# Patient Record
Sex: MALE | Race: WHITE | NOT HISPANIC OR LATINO | Employment: FULL TIME | URBAN - NONMETROPOLITAN AREA
[De-identification: names, ages, dates, MRNs, and addresses within clinical notes are randomized per-mention and may not be internally consistent; named-entity substitution may affect disease eponyms.]

---

## 2017-05-15 ENCOUNTER — OFFICE VISIT (OUTPATIENT)
Dept: GASTROENTEROLOGY | Facility: CLINIC | Age: 57
End: 2017-05-15

## 2017-05-15 VITALS
HEART RATE: 78 BPM | BODY MASS INDEX: 36.88 KG/M2 | TEMPERATURE: 97.8 F | DIASTOLIC BLOOD PRESSURE: 76 MMHG | WEIGHT: 235 LBS | SYSTOLIC BLOOD PRESSURE: 120 MMHG | HEIGHT: 67 IN

## 2017-05-15 DIAGNOSIS — K21.9 GASTROESOPHAGEAL REFLUX DISEASE WITHOUT ESOPHAGITIS: Primary | ICD-10-CM

## 2017-05-15 PROCEDURE — 99213 OFFICE O/P EST LOW 20 MIN: CPT | Performed by: INTERNAL MEDICINE

## 2017-05-15 RX ORDER — PREDNISOLONE ACETATE 10 MG/ML
SUSPENSION/ DROPS OPHTHALMIC
COMMUNITY
Start: 2017-02-27 | End: 2018-05-24

## 2017-05-15 RX ORDER — PANTOPRAZOLE SODIUM 40 MG/1
40 TABLET, DELAYED RELEASE ORAL
Qty: 180 TABLET | Refills: 3 | Status: SHIPPED | OUTPATIENT
Start: 2017-05-15 | End: 2018-05-24 | Stop reason: SDUPTHER

## 2018-05-24 ENCOUNTER — OFFICE VISIT (OUTPATIENT)
Dept: GASTROENTEROLOGY | Facility: CLINIC | Age: 58
End: 2018-05-24

## 2018-05-24 VITALS
HEIGHT: 67 IN | OXYGEN SATURATION: 98 % | SYSTOLIC BLOOD PRESSURE: 138 MMHG | HEART RATE: 80 BPM | DIASTOLIC BLOOD PRESSURE: 84 MMHG | BODY MASS INDEX: 37.83 KG/M2 | TEMPERATURE: 97.5 F | WEIGHT: 241 LBS

## 2018-05-24 DIAGNOSIS — Z86.010 HISTORY OF ADENOMATOUS POLYP OF COLON: ICD-10-CM

## 2018-05-24 DIAGNOSIS — K21.9 GASTROESOPHAGEAL REFLUX DISEASE, ESOPHAGITIS PRESENCE NOT SPECIFIED: Primary | ICD-10-CM

## 2018-05-24 PROBLEM — Z86.0101 HISTORY OF ADENOMATOUS POLYP OF COLON: Status: ACTIVE | Noted: 2018-05-24

## 2018-05-24 PROCEDURE — 99213 OFFICE O/P EST LOW 20 MIN: CPT | Performed by: NURSE PRACTITIONER

## 2018-05-24 RX ORDER — PANTOPRAZOLE SODIUM 40 MG/1
40 TABLET, DELAYED RELEASE ORAL
Qty: 180 TABLET | Refills: 3 | Status: SHIPPED | OUTPATIENT
Start: 2018-05-24 | End: 2019-12-09 | Stop reason: SDUPTHER

## 2018-05-24 NOTE — PROGRESS NOTES
Chief Complaint   Patient presents with   • Follow-up     Patient here for yearly follow up with medication refill of protonix.        Subjective   HPI    Pt presents to office with long  history of GERD related symptoms.  GERD currently described as stable  Pt is maintained on Protonix daily will increase to bid if needed (not often) .  Pt denies heartburn, indigestion, N/V, abdominal pain.  Pt is compliant with medication instruction.  Last EGD: 2015.  This procedure reviewed with patient.    CScope (Dr Tai) 2015-medium, sessile tubular adenomatous polyps removed from prox ascending colon  Endoscopy (Dr Tai) 2015 antriits    No chest pain, no SOA, no home o2    Past Medical History:   Diagnosis Date   • Acid reflux    • Bronchitis    • History of ear infections      Outpatient Prescriptions Marked as Taking for the 5/24/18 encounter (Office Visit) with RAMON Aleman   Medication Sig Dispense Refill   • pantoprazole (PROTONIX) 40 MG EC tablet Take 1 tablet by mouth 2 (Two) Times a Day Before Meals. 180 tablet 3   • [DISCONTINUED] pantoprazole (PROTONIX) 40 MG EC tablet Take 1 tablet by mouth 2 (Two) Times a Day Before Meals. 180 tablet 3     No Known Allergies  Social History     Social History   • Marital status:      Spouse name: N/A   • Number of children: N/A   • Years of education: N/A     Occupational History   • Not on file.     Social History Main Topics   • Smoking status: Never Smoker   • Smokeless tobacco: Never Used   • Alcohol use No   • Drug use: No   • Sexual activity: Not on file     Other Topics Concern   • Not on file     Social History Narrative   • No narrative on file     Family History   Problem Relation Age of Onset   • Diabetes Mother    • Heart disease Mother    • Heart disease Father    • Diabetes Father    • Cancer Sister    • Cancer Brother    • Cancer Paternal Grandmother    • Cancer Paternal Grandfather    • Colon cancer Neg Hx    • Esophageal cancer Neg Hx       Review of Systems   Constitutional: Negative for fatigue, fever and unexpected weight change.   HENT: Negative for hearing loss, sore throat and voice change.    Eyes: Negative for visual disturbance.   Respiratory: Negative for cough, shortness of breath and wheezing.    Cardiovascular: Negative for chest pain and palpitations.   Gastrointestinal: Negative for abdominal pain, blood in stool and vomiting.   Endocrine: Negative for polydipsia and polyuria.   Genitourinary: Negative for difficulty urinating, dysuria, hematuria and urgency.   Musculoskeletal: Negative for joint swelling and myalgias.   Skin: Negative for color change, rash and wound.   Neurological: Negative for dizziness, tremors, seizures and syncope.   Hematological: Does not bruise/bleed easily.   Psychiatric/Behavioral: Negative for agitation and confusion. The patient is not nervous/anxious.      Objective   Vitals:    05/24/18 0853   BP: 138/84   Pulse: 80   Temp: 97.5 °F (36.4 °C)   SpO2: 98%     Physical Exam   Constitutional: He is oriented to person, place, and time. He appears well-developed and well-nourished. He is cooperative.   HENT:   Head: Normocephalic and atraumatic.   Eyes: Conjunctivae are normal. Pupils are equal, round, and reactive to light. No scleral icterus.   Neck: Normal range of motion. Neck supple. No JVD present. No thyroid mass and no thyromegaly present.   Cardiovascular: Normal rate, regular rhythm and normal heart sounds.  Exam reveals no gallop and no friction rub.    No murmur heard.  Pulmonary/Chest: Effort normal and breath sounds normal. No accessory muscle usage. No respiratory distress. He has no wheezes. He has no rales.   Abdominal: Soft. Normal appearance and bowel sounds are normal. He exhibits no distension, no ascites and no mass. There is no hepatosplenomegaly. There is no tenderness. There is no rebound and no guarding.   Musculoskeletal: Normal range of motion. He exhibits no edema or tenderness.      Vascular Status -  His right foot exhibits normal foot vasculature  and no edema. His left foot exhibits normal foot vasculature  and no edema.  Lymphadenopathy:     He has no cervical adenopathy.   Neurological: He is alert and oriented to person, place, and time. He has normal strength. Gait normal.   Skin: Skin is warm, dry and intact. No rash noted.     Imaging Results (most recent)     None          Body mass index is 37.75 kg/m².    Assessment/Plan   Gene was seen today for follow-up.    Diagnoses and all orders for this visit:    Gastroesophageal reflux disease, esophagitis presence not specified    History of adenomatous polyp of colon  -     Case Request; Standing  -     Implement Anesthesia Orders Day of Procedure; Standing  -     Obtain Informed Consent; Standing  -     Case Request    Other orders  -     pantoprazole (PROTONIX) 40 MG EC tablet; Take 1 tablet by mouth 2 (Two) Times a Day Before Meals.      COLONOSCOPY WITH ANESTHESIA (N/A)     Pt will call office in Dec 2018/Jan 2019 when ready to schedule cscope  clenpiq prep given to pt    Continue protonix daily, take 30 min prior to meal    There are no Patient Instructions on file for this visit.

## 2018-05-25 ENCOUNTER — TELEPHONE (OUTPATIENT)
Dept: GASTROENTEROLOGY | Facility: CLINIC | Age: 58
End: 2018-05-25

## 2018-05-25 NOTE — TELEPHONE ENCOUNTER
Pt not due until Dec 2018    Please cancel csope    Pt/wife will call back when ready to schedule as they may wait until Jan 2019

## 2018-10-04 ENCOUNTER — TELEPHONE (OUTPATIENT)
Dept: GASTROENTEROLOGY | Facility: CLINIC | Age: 58
End: 2018-10-04

## 2018-10-04 ENCOUNTER — PREP FOR SURGERY (OUTPATIENT)
Dept: OTHER | Facility: HOSPITAL | Age: 58
End: 2018-10-04

## 2018-10-04 DIAGNOSIS — Z86.010 HISTORY OF ADENOMATOUS POLYP OF COLON: Primary | ICD-10-CM

## 2018-10-04 NOTE — TELEPHONE ENCOUNTER
patient has called and rescheduled his colon for 12-7-18 at 7. I think I need a new case request   thanks

## 2018-10-05 NOTE — TELEPHONE ENCOUNTER
Case request entered  If he does not have a prep have him come by office and pick one up please    ty

## 2018-12-07 ENCOUNTER — ANESTHESIA EVENT (OUTPATIENT)
Dept: GASTROENTEROLOGY | Facility: HOSPITAL | Age: 58
End: 2018-12-07

## 2018-12-07 ENCOUNTER — HOSPITAL ENCOUNTER (OUTPATIENT)
Facility: HOSPITAL | Age: 58
Setting detail: HOSPITAL OUTPATIENT SURGERY
Discharge: HOME | End: 2018-12-07
Attending: INTERNAL MEDICINE | Admitting: INTERNAL MEDICINE

## 2018-12-07 ENCOUNTER — ANESTHESIA (OUTPATIENT)
Dept: GASTROENTEROLOGY | Facility: HOSPITAL | Age: 58
End: 2018-12-07

## 2018-12-07 VITALS
DIASTOLIC BLOOD PRESSURE: 70 MMHG | RESPIRATION RATE: 26 BRPM | HEART RATE: 74 BPM | BODY MASS INDEX: 36.41 KG/M2 | SYSTOLIC BLOOD PRESSURE: 135 MMHG | HEIGHT: 67 IN | WEIGHT: 232 LBS | OXYGEN SATURATION: 96 % | TEMPERATURE: 97.4 F

## 2018-12-07 PROCEDURE — 25010000002 PROPOFOL 10 MG/ML EMULSION: Performed by: NURSE ANESTHETIST, CERTIFIED REGISTERED

## 2018-12-07 PROCEDURE — G0105 COLORECTAL SCRN; HI RISK IND: HCPCS | Performed by: INTERNAL MEDICINE

## 2018-12-07 RX ORDER — SODIUM CHLORIDE 9 MG/ML
500 INJECTION, SOLUTION INTRAVENOUS CONTINUOUS PRN
Status: DISCONTINUED | OUTPATIENT
Start: 2018-12-07 | End: 2018-12-07 | Stop reason: HOSPADM

## 2018-12-07 RX ORDER — PROPOFOL 10 MG/ML
VIAL (ML) INTRAVENOUS AS NEEDED
Status: DISCONTINUED | OUTPATIENT
Start: 2018-12-07 | End: 2018-12-07 | Stop reason: SURG

## 2018-12-07 RX ORDER — LIDOCAINE HYDROCHLORIDE 20 MG/ML
INJECTION, SOLUTION INFILTRATION; PERINEURAL AS NEEDED
Status: DISCONTINUED | OUTPATIENT
Start: 2018-12-07 | End: 2018-12-07 | Stop reason: SURG

## 2018-12-07 RX ORDER — SODIUM CHLORIDE 0.9 % (FLUSH) 0.9 %
3 SYRINGE (ML) INJECTION AS NEEDED
Status: DISCONTINUED | OUTPATIENT
Start: 2018-12-07 | End: 2018-12-07 | Stop reason: HOSPADM

## 2018-12-07 RX ADMIN — SODIUM CHLORIDE 500 ML: 9 INJECTION, SOLUTION INTRAVENOUS at 07:53

## 2018-12-07 RX ADMIN — PROPOFOL 50 MG: 10 INJECTION, EMULSION INTRAVENOUS at 08:34

## 2018-12-07 RX ADMIN — PROPOFOL 25 MG: 10 INJECTION, EMULSION INTRAVENOUS at 08:36

## 2018-12-07 RX ADMIN — PROPOFOL 50 MG: 10 INJECTION, EMULSION INTRAVENOUS at 08:32

## 2018-12-07 RX ADMIN — PROPOFOL 25 MG: 10 INJECTION, EMULSION INTRAVENOUS at 08:38

## 2018-12-07 RX ADMIN — LIDOCAINE HYDROCHLORIDE 40 MG: 20 INJECTION, SOLUTION INFILTRATION; PERINEURAL at 08:30

## 2018-12-07 RX ADMIN — PROPOFOL 50 MG: 10 INJECTION, EMULSION INTRAVENOUS at 08:30

## 2018-12-07 NOTE — ANESTHESIA POSTPROCEDURE EVALUATION
Patient: Gene Mohamud    Procedure Summary     Date:  12/07/18 Room / Location:  [facility] ENDOSCOPY 6 / [facility]    Anesthesia Start:  0824 Anesthesia Stop:  0845    Procedure:  COLONOSCOPY WITH ANESTHESIA (N/A ) Diagnosis:       History of adenomatous polyp of colon      (History of adenomatous polyp of colon [Z86.010])    Surgeon:  Jonathan Tai DO Provider:  Rakesh Zamudio CRNA    Anesthesia Type:  general ASA Status:  2          Anesthesia Type: general  Last vitals  BP   123/68 (12/07/18 0900)   Temp   97.4 °F (36.3 °C) (12/07/18 0735)   Pulse   74 (12/07/18 0900)   Resp   17 (12/07/18 0900)     SpO2   95 % (12/07/18 0900)     Post Anesthesia Care and Evaluation    Patient location during evaluation: PHASE II  Patient participation: complete - patient participated  Level of consciousness: awake  Pain score: 0  Pain management: adequate  Anesthetic complications: No anesthetic complications  PONV Status: none  Cardiovascular status: acceptable  Respiratory status: acceptable  Hydration status: acceptable  No anesthesia care post op

## 2018-12-07 NOTE — ANESTHESIA PREPROCEDURE EVALUATION
Anesthesia Evaluation     Patient summary reviewed and Nursing notes reviewed   no history of anesthetic complications:  NPO Solid Status: > 8 hours  NPO Liquid Status: > 2 hours           Airway   Mallampati: III  TM distance: >3 FB  Neck ROM: full  Possible difficult intubation and Small opening  Dental      Pulmonary    (+) sleep apnea on CPAP,   (-) COPD, not a smoker  Cardiovascular   Exercise tolerance: good (4-7 METS)    (-) past MI, CAD      Neuro/Psych  (-) seizures, TIA, CVA  GI/Hepatic/Renal/Endo    (+)  GERD,    (-) liver disease, no renal disease, diabetes    Musculoskeletal     Abdominal    Substance History      OB/GYN          Other                        Anesthesia Plan    ASA 2     general   total IV anesthesia  intravenous induction   Anesthetic plan, all risks, benefits, and alternatives have been provided, discussed and informed consent has been obtained with: patient.

## 2018-12-07 NOTE — H&P
"[facility]  Pre Procedure History & Physical    Chief Complaint:   Polyps    Subjective     HPI:   polyps    Past Medical History:   Past Medical History:   Diagnosis Date   • Acid reflux    • Bronchitis    • History of ear infections    • Sleep apnea     cpap at hs       Past Surgical History:  Past Surgical History:   Procedure Laterality Date   • COLONOSCOPY  12/14/2015   • ENDOSCOPY  12/14/2015   • FRACTURE SURGERY Right     arm       Family History:  Family History   Problem Relation Age of Onset   • Diabetes Mother    • Heart disease Mother    • Heart disease Father    • Diabetes Father    • Cancer Sister    • Cancer Brother    • Cancer Paternal Grandmother    • Cancer Paternal Grandfather    • Colon cancer Neg Hx    • Esophageal cancer Neg Hx        Social History:   reports that  has never smoked. he has never used smokeless tobacco. He reports that he does not drink alcohol or use drugs.    Medications:   Prior to Admission medications    Medication Sig Start Date End Date Taking? Authorizing Provider   pantoprazole (PROTONIX) 40 MG EC tablet Take 1 tablet by mouth 2 (Two) Times a Day Before Meals. 5/24/18   Chas Ga APRN       Allergies:  Patient has no known allergies.    ROS:    General: Weight stable  Resp: No SOA  Cardiovascular: No CP    Objective     Blood pressure 118/81, pulse 74, temperature 97.4 °F (36.3 °C), temperature source Temporal, resp. rate 18, height 168.9 cm (66.5\"), weight 105 kg (232 lb), SpO2 94 %.    Physical Exam   Constitutional: Pt is oriented to person, place, and in no distress.   HENT: Mouth/Throat: Oropharynx is clear.   Cardiovascular: Normal rate, regular rhythm.    Pulmonary/Chest: Effort normal. No respiratory distress. No  wheezes.   Abdominal: Soft. Non-distended.  Skin: Skin is warm and dry.   Psychiatric: Mood, memory, affect and judgment appear normal.     Assessment/Plan     Diagnosis:  Polyps    Anticipated Surgical " Procedure:  C-scope    The risks, benefits, and alternatives of this procedure have been discussed with the patient or the responsible party- the patient understands and agrees to proceed.

## 2019-04-12 ENCOUNTER — TELEPHONE (OUTPATIENT)
Dept: GASTROENTEROLOGY | Facility: CLINIC | Age: 59
End: 2019-04-12

## 2019-05-08 ENCOUNTER — TELEPHONE (OUTPATIENT)
Dept: GASTROENTEROLOGY | Facility: CLINIC | Age: 59
End: 2019-05-08

## 2019-05-08 NOTE — TELEPHONE ENCOUNTER
Called [employer] [phone] got approval for 3 years pantoprazole 40mg bid. Called g@o and they said it went thru.good till 5-8-2022

## 2019-07-26 ENCOUNTER — TRANSCRIBE ORDERS (OUTPATIENT)
Dept: ADMINISTRATIVE | Facility: HOSPITAL | Age: 59
End: 2019-07-26

## 2019-07-26 DIAGNOSIS — R00.2 PALPITATIONS: Primary | ICD-10-CM

## 2019-07-30 ENCOUNTER — HOSPITAL ENCOUNTER (OUTPATIENT)
Dept: CARDIOLOGY | Facility: HOSPITAL | Age: 59
Discharge: HOME | End: 2019-07-30
Admitting: NURSE PRACTITIONER

## 2019-07-30 PROCEDURE — 93226 XTRNL ECG REC<48 HR SCAN A/R: CPT

## 2019-07-30 PROCEDURE — 93225 XTRNL ECG REC<48 HRS REC: CPT

## 2019-08-04 PROCEDURE — 93227 XTRNL ECG REC<48 HR R&I: CPT | Performed by: INTERNAL MEDICINE

## 2019-11-18 RX ORDER — PANTOPRAZOLE SODIUM 40 MG/1
TABLET, DELAYED RELEASE ORAL
Qty: 180 TABLET | Refills: 3 | OUTPATIENT
Start: 2019-11-18

## 2019-12-02 RX ORDER — PANTOPRAZOLE SODIUM 40 MG/1
TABLET, DELAYED RELEASE ORAL
Qty: 180 TABLET | Refills: 3 | OUTPATIENT
Start: 2019-12-02

## 2019-12-08 NOTE — PROGRESS NOTES
Chief Complaint   Patient presents with   • Heartburn     needs pantoprazole 40mg bid g@0 3 month refills       PCP: Óscar Rivero MD      Subjective   HPI    Pt presents to office with long history of GERD related symptoms.  GERD currently described as stable  Pt is maintained on Protonix daily .  Pt denies heartburn, indigestion, N/V, abdominal pain, dysphagia.  Pt is compliant with medication instruction.  Last EGD: 2015.  This procedure reviewed with patient.    CScope (Dr Tai) 12/2018-normal (5 years)   CScope (Dr Tai) 2015-medium, sessile tubular adenomatous polyps removed from prox ascending colon    Endoscopy (Dr Tai) 2015 antriits       Past Medical History:   Diagnosis Date   • Acid reflux    • Bronchitis    • History of ear infections    • Sleep apnea     cpap at      Outpatient Medications Marked as Taking for the 12/9/19 encounter (Office Visit) with Chas Ga APRN   Medication Sig Dispense Refill   • pantoprazole (PROTONIX) 40 MG EC tablet Take 1 tablet by mouth 2 (Two) Times a Day Before Meals. 180 tablet 4   • [DISCONTINUED] pantoprazole (PROTONIX) 40 MG EC tablet Take 1 tablet by mouth 2 (Two) Times a Day Before Meals. 180 tablet 3     No Known Allergies  Social History     Socioeconomic History   • Marital status:      Spouse name: Not on file   • Number of children: Not on file   • Years of education: Not on file   • Highest education level: Not on file   Tobacco Use   • Smoking status: Never Smoker   • Smokeless tobacco: Never Used   Substance and Sexual Activity   • Alcohol use: No   • Drug use: No   • Sexual activity: Defer     Family History   Problem Relation Age of Onset   • Diabetes Mother    • Heart disease Mother    • Heart disease Father    • Diabetes Father    • Cancer Sister    • Cancer Brother    • Cancer Paternal Grandmother    • Cancer Paternal Grandfather    • Colon cancer Neg Hx    • Esophageal cancer Neg Hx      Review of Systems    Constitutional: Negative for fatigue, fever and unexpected weight change.   HENT: Negative for hearing loss, sore throat and voice change.    Eyes: Negative for visual disturbance.   Respiratory: Negative for cough, shortness of breath and wheezing.    Cardiovascular: Negative for chest pain and palpitations.   Gastrointestinal: Negative for abdominal pain, blood in stool and vomiting.   Endocrine: Negative for polydipsia and polyuria.   Genitourinary: Negative for difficulty urinating, dysuria, hematuria and urgency.   Musculoskeletal: Negative for joint swelling and myalgias.   Skin: Negative for color change, rash and wound.   Neurological: Negative for dizziness, tremors, seizures and syncope.   Hematological: Does not bruise/bleed easily.   Psychiatric/Behavioral: Negative for agitation and confusion. The patient is not nervous/anxious.      Objective   Vitals:    12/09/19 1036   BP: 130/70   Pulse: 81   Temp: 97 °F (36.1 °C)   SpO2: 98%     Physical Exam   Constitutional: He is oriented to person, place, and time. He appears well-developed and well-nourished. He is cooperative.   HENT:   Head: Normocephalic and atraumatic.   Eyes: Pupils are equal, round, and reactive to light. Conjunctivae are normal. No scleral icterus.   Neck: Normal range of motion. Neck supple. No JVD present. No thyroid mass and no thyromegaly present.   Cardiovascular: Normal rate, regular rhythm and normal heart sounds. Exam reveals no gallop and no friction rub.   No murmur heard.  Pulmonary/Chest: Effort normal and breath sounds normal. No accessory muscle usage. No respiratory distress. He has no wheezes. He has no rales.   Abdominal: Soft. Normal appearance and bowel sounds are normal. He exhibits no distension, no ascites and no mass. There is no hepatosplenomegaly. There is no tenderness. There is no rebound and no guarding.   Musculoskeletal: Normal range of motion. He exhibits no edema or tenderness.     Vascular Status -   His right foot exhibits normal foot vasculature  and no edema. His left foot exhibits normal foot vasculature  and no edema.  Lymphadenopathy:     He has no cervical adenopathy.   Neurological: He is alert and oriented to person, place, and time. He has normal strength. Gait normal.   Skin: Skin is warm, dry and intact. No rash noted.     Imaging Results (Most Recent)     None          Body mass index is 36.02 kg/m².    Assessment/Plan   Gene was seen today for heartburn.    Diagnoses and all orders for this visit:    Gastroesophageal reflux disease without esophagitis    History of adenomatous polyp of colon    Other orders  -     pantoprazole (PROTONIX) 40 MG EC tablet; Take 1 tablet by mouth 2 (Two) Times a Day Before Meals.      * Surgery not found *      Take PPI 30 min prior to breakfast   Decrease caffeine, nicotine, etoh- all contribute to acid reflux  Do not eat 2-3 hours within lying down, elevated HOB 4-6 inches     Colonoscopy up to date   There are no Patient Instructions on file for this visit.

## 2019-12-09 ENCOUNTER — OFFICE VISIT (OUTPATIENT)
Dept: GASTROENTEROLOGY | Facility: CLINIC | Age: 59
End: 2019-12-09

## 2019-12-09 VITALS
WEIGHT: 230 LBS | HEART RATE: 81 BPM | TEMPERATURE: 97 F | HEIGHT: 67 IN | OXYGEN SATURATION: 98 % | BODY MASS INDEX: 36.1 KG/M2 | SYSTOLIC BLOOD PRESSURE: 130 MMHG | DIASTOLIC BLOOD PRESSURE: 70 MMHG

## 2019-12-09 DIAGNOSIS — K21.9 GASTROESOPHAGEAL REFLUX DISEASE WITHOUT ESOPHAGITIS: Primary | ICD-10-CM

## 2019-12-09 DIAGNOSIS — Z86.010 HISTORY OF ADENOMATOUS POLYP OF COLON: ICD-10-CM

## 2019-12-09 PROCEDURE — 99212 OFFICE O/P EST SF 10 MIN: CPT | Performed by: NURSE PRACTITIONER

## 2019-12-09 RX ORDER — PANTOPRAZOLE SODIUM 40 MG/1
40 TABLET, DELAYED RELEASE ORAL
Qty: 180 TABLET | Refills: 4 | Status: SHIPPED | OUTPATIENT
Start: 2019-12-09 | End: 2021-04-23 | Stop reason: SDUPTHER

## 2020-12-26 ENCOUNTER — IMMUNIZATION (OUTPATIENT)
Dept: VACCINE CLINIC | Facility: HOSPITAL | Age: 60
End: 2020-12-26

## 2020-12-26 PROCEDURE — 0011A: CPT | Performed by: OBSTETRICS & GYNECOLOGY

## 2020-12-26 PROCEDURE — 91301 HC SARSCO02 VAC 100MCG/0.5ML IM: CPT | Performed by: OBSTETRICS & GYNECOLOGY

## 2021-01-02 ENCOUNTER — APPOINTMENT (OUTPATIENT)
Dept: VACCINE CLINIC | Facility: HOSPITAL | Age: 61
End: 2021-01-02

## 2021-01-15 ENCOUNTER — NURSE TRIAGE (OUTPATIENT)
Dept: CALL CENTER | Facility: HOSPITAL | Age: 61
End: 2021-01-15

## 2021-01-15 NOTE — TELEPHONE ENCOUNTER
"Caller  is  at [employer].  was exposed to covid positive brother yesterday-was in close contact for about 20-30 minutes with both of them wearing masks. Caller states no s/s currently. Instructed per Care Advice. Advised to call Employee Health with  immediately and given phone number. Also advised to notify PCP of exposure. [facility] will call back if any questions/concerns.    Reason for Disposition  • [1] CLOSE CONTACT COVID-19 EXPOSURE within last 14 days AND [2] needs COVID-19 lab test to return to work AND [3] NO symptoms    Additional Information  • Negative: COVID-19 lab test positive  • Negative: [1] Lives with someone known to have influenza (flu test positive) AND [2] flu-like symptoms (e.g., cough, runny nose, sore throat, SOB; with or without fever)  • Negative: [1] Symptoms of COVID-19 (e.g., cough, fever, SOB, or others) AND [2] HCP diagnosed COVID-19 based on symptoms  • Negative: [1] Symptoms of COVID-19 (e.g., cough, fever, SOB, or others) AND [2] lives in an area with community spread  • Negative: [1] Symptoms of COVID-19 (e.g., cough, fever, SOB, or others) AND [2] within 14 days of EXPOSURE (close contact) with diagnosed or suspected COVID-19 patient  • Negative: [1] Symptoms of COVID-19 (e.g., cough, fever, SOB, or others) AND [2] within 14 days of travel from high-risk area for COVID-19 community spread (identified by CDC)  • Negative: [1] Difficulty breathing (shortness of breath) occurs AND [2] onset > 14 days after COVID-19 EXPOSURE (Close Contact)  • Negative: [1] Dry cough occurs AND [2] onset > 14 days after COVID-19 EXPOSURE  • Negative: [1] Wet cough (i.e., white-yellow, yellow, green, or jourdan colored sputum) AND [2] onset > 14 days after COVID-19 EXPOSURE  • Negative: [1] Common cold symptoms AND [2] onset > 14 days after COVID-19 EXPOSURE    Answer Assessment - Initial Assessment Questions  1. COVID-19 CLOSE CONTACT: \"Who is the person with the confirmed or " "suspected COVID-19 infection that you were exposed to?\"      brother  2. PLACE of CONTACT: \"Where were you when you were exposed to COVID-19?\" (e.g., home, school, medical waiting room; which city?)      Took brother to appt yesterday.  3. TYPE of CONTACT: \"How much contact was there?\" (e.g., sitting next to, live in same house, work in same office, same building)      Around 20-30 minutes.  4. DURATION of CONTACT: \"How long were you in contact with the COVID-19 patient?\" (e.g., a few seconds, passed by person, a few minutes, 15 minutes or longer, live with the patient)      20-30 minutes with patient and brother wearing masks.  5. MASK: \"Were you wearing a mask?\" \"Was the other person wearing a mask?\" Note: wearing a mask reduces the risk of an   otherwise close contact.      Yes-both were.  6. DATE of CONTACT: \"When did you have contact with a COVID-19 patient?\" (e.g., how many days ago)      01/14/21  7. COMMUNITY SPREAD: \"Are there lots of cases of COVID-19 (community spread) where you live?\" (See public health department website, if unsure)        yes  8. SYMPTOMS: \"Do you have any symptoms?\" (e.g., fever, cough, breathing difficulty, loss of taste or smell)      No s/s.  9. PREGNANCY OR POSTPARTUM: \"Is there any chance you are pregnant?\" \"When was your last menstrual period?\" \"Did you deliver in the last 2 weeks?\"      n/a  10. HIGH RISK: \"Do you have any heart or lung problems? Do you have a weak immune system?\" (e.g., heart failure, COPD, asthma, HIV positive, chemotherapy, renal failure, diabetes mellitus, sickle cell anemia, obesity)        Denies-borderline for diabetes.  11.  TRAVEL: \"Have you traveled out of the country recently?\" If so, \"When and where?\"  Also ask about out-of-state travel, since the [facility] has identified some high-risk cities for community spread in the [place].  Note: Travel becomes less relevant if there is widespread community transmission where the patient lives.        no    Protocols " used: CORONAVIRUS (COVID-19) EXPOSURE-ADULT-AH

## 2021-01-23 ENCOUNTER — IMMUNIZATION (OUTPATIENT)
Dept: VACCINE CLINIC | Facility: HOSPITAL | Age: 61
End: 2021-01-23

## 2021-01-23 PROCEDURE — 91301 HC SARSCO02 VAC 100MCG/0.5ML IM: CPT | Performed by: OBSTETRICS & GYNECOLOGY

## 2021-01-23 PROCEDURE — 0012A: CPT | Performed by: OBSTETRICS & GYNECOLOGY

## 2021-03-30 ENCOUNTER — TELEPHONE (OUTPATIENT)
Dept: GASTROENTEROLOGY | Facility: CLINIC | Age: 61
End: 2021-03-30

## 2021-04-22 NOTE — PROGRESS NOTES
No chief complaint on file.      PCP: Óscar Rivero MD      Subjective   HPI    Pt presents to office with *** history of GERD related symptoms.  GERD currently described as {stable/worsenin}  Pt is maintained on {Blank multiple:89512} {daily/bid:27819} .  Pt denies {gerd sx:31164}.  Pt is compliant with medication instruction.  Last EGD: ***.  This procedure reviewed with patient.    CScope (Dr Tai) 2018-normal (5 years)   CScope (Dr Tai) -medium, sessile tubular adenomatous polyps removed from prox ascending colon     Endoscopy (Dr Tai)  antriits    Past Medical History:   Diagnosis Date   • Acid reflux    • Bronchitis    • History of ear infections    • Sleep apnea     cpap at hs     No outpatient medications have been marked as taking for the 21 encounter (Appointment) with Chas Ga APRN.     No Known Allergies  Social History     Socioeconomic History   • Marital status:      Spouse name: Not on file   • Number of children: Not on file   • Years of education: Not on file   • Highest education level: Not on file   Tobacco Use   • Smoking status: Never Smoker   • Smokeless tobacco: Never Used   Substance and Sexual Activity   • Alcohol use: No   • Drug use: No   • Sexual activity: Defer     Family History   Problem Relation Age of Onset   • Diabetes Mother    • Heart disease Mother    • Heart disease Father    • Diabetes Father    • Cancer Sister    • Cancer Brother    • Cancer Paternal Grandmother    • Cancer Paternal Grandfather    • Colon cancer Neg Hx    • Esophageal cancer Neg Hx      Review of Systems   Constitutional: Negative for unexpected weight change.   Respiratory: Negative for shortness of breath.    Cardiovascular: Negative for chest pain.   Gastrointestinal: Negative for abdominal pain and anal bleeding.     Objective   There were no vitals filed for this visit.  Physical Exam  Imaging Results (Most Recent)     None          There is no  height or weight on file to calculate BMI.    Assessment/Plan   There are no diagnoses linked to this encounter.  * Surgery not found *    There are no Patient Instructions on file for this visit.      Chas Ga, RAOMN  04/22/21

## 2021-04-22 NOTE — PROGRESS NOTES
Chief Complaint   Patient presents with   • Med Refill       PCP: Óscar Rivero MD  REFER: No ref. provider found    Subjective     HPI    VIDEO VISIT:  Pt presents to office with long history of GERD related symptoms.  GERD currently described as stable  Pt is maintained on Protonix bid.  Pt denies heartburn, indigestion, N/V, abdominal pain, dysphagia.  No bright red blood per rectum, no melena.  No change in bowels.   Pt is compliant with medication instruction.  Last EGD: 2015.  This procedure reviewed with patient.     CScope (Dr Tai) 12/2018-normal (5 years)   CScope (Dr Tai) 2015-medium, sessile tubular adenomatous polyps removed from prox ascending colon     Endoscopy (Dr Tai) 2015 antriits    Past Medical History:   Diagnosis Date   • Acid reflux    • Bronchitis    • History of ear infections    • Sleep apnea     cpap at hs       Past Surgical History:   Procedure Laterality Date   • COLONOSCOPY  12/14/2015   • COLONOSCOPY N/A 12/7/2018    Procedure: COLONOSCOPY WITH ANESTHESIA;  Surgeon: Jonathan Tai DO;  Location: [facility] ENDOSCOPY;  Service: Gastroenterology   • ENDOSCOPY  12/14/2015   • FRACTURE SURGERY Right     arm       Outpatient Medications Marked as Taking for the 4/23/21 encounter (Telemedicine) with Chas Ga APRN   Medication Sig Dispense Refill   • pantoprazole (PROTONIX) 40 MG EC tablet Take 1 tablet by mouth 2 (Two) Times a Day Before Meals. 180 tablet 3   • [DISCONTINUED] pantoprazole (PROTONIX) 40 MG EC tablet Take 1 tablet by mouth 2 (Two) Times a Day Before Meals. 180 tablet 4       No Known Allergies    Social History     Socioeconomic History   • Marital status:      Spouse name: Not on file   • Number of children: Not on file   • Years of education: Not on file   • Highest education level: Not on file   Tobacco Use   • Smoking status: Never Smoker   • Smokeless tobacco: Never Used   Vaping Use   • Vaping Use: Never used   Substance and Sexual  Activity   • Alcohol use: No   • Drug use: No   • Sexual activity: Defer       Review of Systems   Constitutional: Negative for unexpected weight change.   Respiratory: Negative for shortness of breath.    Cardiovascular: Negative for chest pain.   Gastrointestinal: Negative for abdominal pain and anal bleeding.       Objective     There were no vitals filed for this visit.  There is no height or weight on file to calculate BMI.    Virtual Visit Physical Exam  Physical Exam   Constitutional: appears well-nourished.   HENT:   Head: Atraumatic.   Nose: Nose normal.   Eyes: EOM are normal. Right eye exhibits no discharge. Left eye exhibits no discharge.   Neck: Neck normal appearance.  Pulmonary/Chest: Effort normal.   Abdominal: Abdomen appears normal.   Musculoskeletal: Normal range of motion.   Neurological:alert.   Skin: Skin is dry.   Psychiatric:  normal mood and affect.       Imaging Results (Most Recent)     None          There is no height or weight on file to calculate BMI.    Assessment/Plan     Diagnoses and all orders for this visit:    1. Gastroesophageal reflux disease without esophagitis (Primary)    Other orders  -     pantoprazole (PROTONIX) 40 MG EC tablet; Take 1 tablet by mouth 2 (Two) Times a Day Before Meals.  Dispense: 180 tablet; Refill: 3        * Surgery not found *      Take PPI 30 min prior to breakfast & evening meal  Decrease caffeine, nicotine, etoh- all contribute to acid reflux  Do not eat 2-3 hours within lying down, elevated HOB 4-6 inches      Precautions are currently being put in place due to COVID-19.  I have explained to Gene Mohamud they will be required to undergo COVID testing prior to their procedure.  Gene Mohamud verbalized understanding and was willing to proceed.      This was an audio and video enabled telemedicine encounter. This visit was conducted with me in my office and Gene Mohamud at their home    Chas Ga, APRN  04/23/21          There are no  Patient Instructions on file for this visit.

## 2021-04-23 ENCOUNTER — TELEMEDICINE (OUTPATIENT)
Dept: GASTROENTEROLOGY | Facility: CLINIC | Age: 61
End: 2021-04-23

## 2021-04-23 DIAGNOSIS — K21.9 GASTROESOPHAGEAL REFLUX DISEASE WITHOUT ESOPHAGITIS: Primary | ICD-10-CM

## 2021-04-23 PROCEDURE — 99213 OFFICE O/P EST LOW 20 MIN: CPT | Performed by: NURSE PRACTITIONER

## 2021-04-23 RX ORDER — PANTOPRAZOLE SODIUM 40 MG/1
40 TABLET, DELAYED RELEASE ORAL
Qty: 180 TABLET | Refills: 3 | Status: SHIPPED | OUTPATIENT
Start: 2021-04-23 | End: 2022-03-22 | Stop reason: SDUPTHER

## 2021-12-03 ENCOUNTER — IMMUNIZATION (OUTPATIENT)
Dept: VACCINE CLINIC | Facility: HOSPITAL | Age: 61
End: 2021-12-03

## 2021-12-03 DIAGNOSIS — Z23 NEED FOR VACCINATION: Primary | ICD-10-CM

## 2021-12-03 PROCEDURE — 91306 HC SARSCOV2 VAC 50MCG/0.25ML IM: CPT | Performed by: OBSTETRICS & GYNECOLOGY

## 2021-12-03 PROCEDURE — 0064A HC ADM SARSCOV2 50MCG/0.25ML BOOSTER: CPT | Performed by: OBSTETRICS & GYNECOLOGY

## 2022-02-11 ENCOUNTER — NURSE TRIAGE (OUTPATIENT)
Dept: CALL CENTER | Facility: HOSPITAL | Age: 62
End: 2022-02-11

## 2022-02-11 NOTE — TELEPHONE ENCOUNTER
"Vomiting with diarrhea which started Monday, took OTC meds, with reief of symptoms. Now symptoms have return , will be calling the provider fpr an an appointment. Denies any covid exposure    Reason for Disposition  • [1] MILD or MODERATE vomiting AND [2] present > 48 hours (2 days) (Exception: mild vomiting with associated diarrhea)    Additional Information  • Negative: Shock suspected (e.g., cold/pale/clammy skin, too weak to stand, low BP, rapid pulse)  • Negative: Difficult to awaken or acting confused (e.g., disoriented, slurred speech)  • Negative: Sounds like a life-threatening emergency to the triager  • Negative: Vomiting occurs only while coughing  • Negative: [1] Pregnant < 20 Weeks AND [2] nausea/vomiting began in early pregnancy (i.e., 4-8 weeks pregnant)  • Negative: Chest pain  • Negative: Headache is main symptom  • Negative: Vomiting (or Nausea) in a cancer patient who is currently (or recently) receiving chemotherapy or radiation therapy, or cancer patient who has metastatic or end-stage cancer and is receiving palliative care  • Negative: [1] Vomiting AND [2] contains red blood or black (\"coffee ground\") material  (Exception: few red streaks in vomit that only happened once)  • Negative: Severe pain in one eye  • Negative: Recent head injury (within last 3 days)  • Negative: Recent abdominal injury (within last 3 days)  • Negative: [1] Insulin-dependent diabetes (Type I) AND [2] glucose > 400 mg/dl (22 mmol/l)  • Negative: [1] Vomiting AND [2] hernia is more painful or swollen than usual  • Negative: [1] SEVERE vomiting (e.g., 6 or more times/day) AND [2] present > 8 hours (Exception: patient sounds well, is drinking liquids, does not sound dehydrated, and vomiting has lasted less than 24 hours)  • Negative: [1] MODERATE vomiting (e.g., 3 - 5 times/day) AND [2] age > 60 years  • Negative: Severe headache (e.g., excruciating) (Exception: similar to previous migraines)  • Negative: High-risk adult " "(e.g., diabetes mellitus, brain tumor, V-P shunt, hernia)  • Negative: [1] Drinking very little AND [2] dehydration suspected (e.g., no urine > 12 hours, very dry mouth, very lightheaded)  • Negative: Patient sounds very sick or weak to the triager  • Negative: [1] Vomiting AND [2] abdomen looks much more swollen than usual  • Negative: [1] Vomiting AND [2] contains bile (green color)  • Negative: [1] Constant abdominal pain AND [2] present > 2 hours  • Negative: [1] Fever > 103 F (39.4 C) AND [2] not able to get the fever down using Fever Care Advice  • Negative: [1] Fever > 101 F (38.3 C) AND [2] age > 60 years  • Negative: [1] Fever > 100.0 F (37.8 C) AND [2] bedridden (e.g., nursing home patient, CVA, chronic illness, recovering from surgery)  • Negative: [1] Fever > 100.0 F (37.8 C) AND [2] weak immune system (e.g., HIV positive, cancer chemo, splenectomy, organ transplant, chronic steroids)  • Negative: Taking any of the following medications: digoxin (Lanoxin), lithium, theophylline, phenytoin (Dilantin)    Answer Assessment - Initial Assessment Questions  1. VOMITING SEVERITY: \"How many times have you vomited in the past 24 hours?\"      - MILD:  1 - 2 times/day     - MODERATE: 3 - 5 times/day, decreased oral intake without significant weight loss or symptoms of dehydration     - SEVERE: 6 or more times/day, vomits everything or nearly everything, with significant weight loss, symptoms of dehydration    moderate  2. ONSET: \"When did the vomiting begin?\"        Monday  3. FLUIDS: \"What fluids or food have you vomited up today?\" \"Have you been able to keep any fluids down?\"      yes  4. ABDOMINAL PAIN: \"Are your having any abdominal pain?\" If yes : \"How bad is it and what does it feel like?\" (e.g., crampy, dull, intermittent, constant)       no  5. DIARRHEA: \"Is there any diarrhea?\" If Yes, ask: \"How many times today?\"       yes  6. CONTACTS: \"Is there anyone else in the family with the same symptoms?\"       " "no  7. CAUSE: \"What do you think is causing your vomiting?\"      unsure  8. HYDRATION STATUS: \"Any signs of dehydration?\" (e.g., dry mouth [not only dry lips], too weak to stand) \"When did you last urinate?\"      no  9. OTHER SYMPTOMS: \"Do you have any other symptoms?\" (e.g., fever, headache, vertigo, vomiting blood or coffee grounds, recent head injury)      no  10. PREGNANCY: \"Is there any chance you are pregnant?\" \"When was your last menstrual period?\"        na    Protocols used: VOMITING-ADULT-AH      "

## 2022-03-20 NOTE — PROGRESS NOTES
Chief Complaint   Patient presents with   • Med Refill     Yearly check up       PCP: Óscar Rivero MD      Subjective   HPI    Pt presents to office with long history of GERD related symptoms.  GERD currently described as stable  Pt is maintained on Protonix daily .  Pt denies heartburn, indigestion, N/V, abdominal pain, dysphagia.  Pt is compliant with medication instruction.  Last EGD: 2015.  This procedure reviewed with patient.  He has decreased caffeine intake.    CScope (Dr Tai) 12/2018-normal (5 years)   CScope (Dr Tai) 2015-medium, sessile tubular adenomatous polyps removed from prox ascending colon     Endoscopy (Dr Tai) 2015 antriits      Past Medical History:   Diagnosis Date   • Acid reflux    • Bronchitis    • History of ear infections    • Sleep apnea     cpap at      Outpatient Medications Marked as Taking for the 3/22/22 encounter (Office Visit) with Chas Ga APRN   Medication Sig Dispense Refill   • pantoprazole (PROTONIX) 40 MG EC tablet Take 1 tablet by mouth 2 (Two) Times a Day Before Meals. 180 tablet 3   • [DISCONTINUED] pantoprazole (PROTONIX) 40 MG EC tablet Take 1 tablet by mouth 2 (Two) Times a Day Before Meals. 180 tablet 3     No Known Allergies  Social History     Socioeconomic History   • Marital status:    Tobacco Use   • Smoking status: Never Smoker   • Smokeless tobacco: Never Used   Vaping Use   • Vaping Use: Never used   Substance and Sexual Activity   • Alcohol use: No   • Drug use: No   • Sexual activity: Defer     Family History   Problem Relation Age of Onset   • Diabetes Mother    • Heart disease Mother    • Heart disease Father    • Diabetes Father    • Cancer Sister    • Cancer Brother    • Cancer Paternal Grandmother    • Cancer Paternal Grandfather    • Colon cancer Neg Hx    • Esophageal cancer Neg Hx      Review of Systems   Constitutional: Negative for unexpected weight change.   Respiratory: Negative for shortness of breath.     Cardiovascular: Negative for chest pain.   Gastrointestinal: Negative for abdominal pain and anal bleeding.     Objective   Vitals:    03/22/22 1319   BP: 146/88   Pulse: 81   Temp: 97.4 °F (36.3 °C)   SpO2: 98%     Physical Exam  Constitutional:       Appearance: Normal appearance. He is well-developed.   Eyes:      General: No scleral icterus.  Cardiovascular:      Rate and Rhythm: Regular rhythm.      Heart sounds: Normal heart sounds. No murmur heard.  Pulmonary:      Effort: Pulmonary effort is normal. No accessory muscle usage.      Breath sounds: Normal breath sounds.   Abdominal:      General: Bowel sounds are normal. There is no distension.      Palpations: Abdomen is soft. There is no mass.      Tenderness: There is no abdominal tenderness. There is no guarding or rebound.   Skin:     General: Skin is warm and dry.      Coloration: Skin is not jaundiced.   Neurological:      Mental Status: He is alert.   Psychiatric:         Behavior: Behavior is cooperative.       Imaging Results (Most Recent)     None          Body mass index is 38.74 kg/m².    Assessment/Plan   Diagnoses and all orders for this visit:    1. Gastroesophageal reflux disease, unspecified whether esophagitis present (Primary)    Other orders  -     pantoprazole (PROTONIX) 40 MG EC tablet; Take 1 tablet by mouth 2 (Two) Times a Day Before Meals.  Dispense: 180 tablet; Refill: 3      * Surgery not found *    Take PPI 30 min prior to breakfast   Decrease caffeine, nicotine, etoh- all contribute to acid reflux  Do not eat 2-3 hours within lying down, elevated HOB 4-6 inches    Colonoscopy is up to date, procedure can be performed earlier if clinically indicated (due 2023)      There are no Patient Instructions on file for this visit.      Chas Ga, APRN  03/22/22

## 2022-03-22 ENCOUNTER — OFFICE VISIT (OUTPATIENT)
Dept: GASTROENTEROLOGY | Facility: CLINIC | Age: 62
End: 2022-03-22

## 2022-03-22 VITALS
OXYGEN SATURATION: 98 % | WEIGHT: 240 LBS | SYSTOLIC BLOOD PRESSURE: 146 MMHG | HEART RATE: 81 BPM | BODY MASS INDEX: 38.57 KG/M2 | HEIGHT: 66 IN | DIASTOLIC BLOOD PRESSURE: 88 MMHG | TEMPERATURE: 97.4 F

## 2022-03-22 DIAGNOSIS — K21.9 GASTROESOPHAGEAL REFLUX DISEASE, UNSPECIFIED WHETHER ESOPHAGITIS PRESENT: Primary | ICD-10-CM

## 2022-03-22 PROCEDURE — 99213 OFFICE O/P EST LOW 20 MIN: CPT | Performed by: NURSE PRACTITIONER

## 2022-03-22 RX ORDER — PANTOPRAZOLE SODIUM 40 MG/1
40 TABLET, DELAYED RELEASE ORAL
Qty: 180 TABLET | Refills: 3 | Status: SHIPPED | OUTPATIENT
Start: 2022-03-22

## 2023-11-29 ENCOUNTER — TELEPHONE (OUTPATIENT)
Dept: GASTROENTEROLOGY | Facility: CLINIC | Age: 63
End: 2023-11-29
Payer: COMMERCIAL

## 2023-11-29 DIAGNOSIS — Z86.010 HISTORY OF ADENOMATOUS POLYP OF COLON: Primary | ICD-10-CM

## 2023-11-29 NOTE — TELEPHONE ENCOUNTER
Gene Mohamud called office to schedule colonoscopy after receiving letter for fast track option     Gene Mohamud does not have GI, HEART, OR LUNG problems at this time     Gene Mohamud verbally states they are able to walk up a flight of stairs or down a crisostomo without shortness of breath or chest pain.       Gene Mohamud current medication list was reviewed and He was informed of medications to take morning of procedure as well as the medications that should be held prior to procedure.       I have asked Gene Mohamud to contact the office if they have changes to health history or insurance after being scheduled for colonoscopy. NAME@ verbalized understanding.       Gene Mohamud was told they must have someone (over the age of 18) to drive them from procedure per policy.   Gene Mohamud verbalized understanding that without a  the procedure would be cancelled.           [facility]     DATE OF PROCEDURE - 01/16/2024 at 630am     COLONOSCOPY (12/07/2018 08:25)     Please enter case request.     I will mail a copy of instructions and instructed Gene Mohamud to contact the office if they have any questions or concerns.

## 2024-01-05 ENCOUNTER — DOCUMENTATION (OUTPATIENT)
Dept: GASTROENTEROLOGY | Facility: CLINIC | Age: 64
End: 2024-01-05
Payer: COMMERCIAL

## 2024-01-05 RX ORDER — PANTOPRAZOLE SODIUM 40 MG/1
40 TABLET, DELAYED RELEASE ORAL
Qty: 180 TABLET | Refills: 3 | Status: SHIPPED | OUTPATIENT
Start: 2024-01-05

## 2024-01-16 ENCOUNTER — ANESTHESIA EVENT (OUTPATIENT)
Dept: GASTROENTEROLOGY | Facility: HOSPITAL | Age: 64
End: 2024-01-16
Payer: COMMERCIAL

## 2024-01-16 ENCOUNTER — HOSPITAL ENCOUNTER (OUTPATIENT)
Facility: HOSPITAL | Age: 64
Setting detail: HOSPITAL OUTPATIENT SURGERY
Discharge: HOME | End: 2024-01-16
Attending: INTERNAL MEDICINE | Admitting: INTERNAL MEDICINE
Payer: COMMERCIAL

## 2024-01-16 ENCOUNTER — ANESTHESIA (OUTPATIENT)
Dept: GASTROENTEROLOGY | Facility: HOSPITAL | Age: 64
End: 2024-01-16
Payer: COMMERCIAL

## 2024-01-16 ENCOUNTER — TELEPHONE (OUTPATIENT)
Dept: GASTROENTEROLOGY | Facility: CLINIC | Age: 64
End: 2024-01-16
Payer: COMMERCIAL

## 2024-01-16 VITALS
RESPIRATION RATE: 20 BRPM | HEIGHT: 65 IN | OXYGEN SATURATION: 94 % | SYSTOLIC BLOOD PRESSURE: 110 MMHG | BODY MASS INDEX: 39.49 KG/M2 | DIASTOLIC BLOOD PRESSURE: 76 MMHG | WEIGHT: 237 LBS | HEART RATE: 81 BPM | TEMPERATURE: 96.8 F

## 2024-01-16 DIAGNOSIS — Z86.010 HISTORY OF ADENOMATOUS POLYP OF COLON: ICD-10-CM

## 2024-01-16 PROCEDURE — 25810000003 SODIUM CHLORIDE 0.9 % SOLUTION

## 2024-01-16 PROCEDURE — 45385 COLONOSCOPY W/LESION REMOVAL: CPT | Performed by: INTERNAL MEDICINE

## 2024-01-16 PROCEDURE — 25010000002 PROPOFOL 10 MG/ML EMULSION

## 2024-01-16 PROCEDURE — 43239 EGD BIOPSY SINGLE/MULTIPLE: CPT | Performed by: INTERNAL MEDICINE

## 2024-01-16 PROCEDURE — 87081 CULTURE SCREEN ONLY: CPT | Performed by: INTERNAL MEDICINE

## 2024-01-16 RX ORDER — SODIUM CHLORIDE 9 MG/ML
500 INJECTION, SOLUTION INTRAVENOUS CONTINUOUS PRN
Status: DISCONTINUED | OUTPATIENT
Start: 2024-01-16 | End: 2024-01-16 | Stop reason: HOSPADM

## 2024-01-16 RX ORDER — SODIUM CHLORIDE 9 MG/ML
40 INJECTION, SOLUTION INTRAVENOUS AS NEEDED
Status: CANCELLED | OUTPATIENT
Start: 2024-01-16

## 2024-01-16 RX ORDER — SODIUM CHLORIDE 0.9 % (FLUSH) 0.9 %
10 SYRINGE (ML) INJECTION EVERY 12 HOURS SCHEDULED
Status: CANCELLED | OUTPATIENT
Start: 2024-01-16

## 2024-01-16 RX ORDER — SODIUM CHLORIDE 0.9 % (FLUSH) 0.9 %
10 SYRINGE (ML) INJECTION AS NEEDED
Status: DISCONTINUED | OUTPATIENT
Start: 2024-01-16 | End: 2024-01-16 | Stop reason: HOSPADM

## 2024-01-16 RX ORDER — ATORVASTATIN CALCIUM 20 MG/1
20 TABLET, FILM COATED ORAL DAILY
COMMUNITY

## 2024-01-16 RX ORDER — SODIUM CHLORIDE 9 MG/ML
100 INJECTION, SOLUTION INTRAVENOUS CONTINUOUS
Status: CANCELLED | OUTPATIENT
Start: 2024-01-16

## 2024-01-16 RX ORDER — PROPOFOL 10 MG/ML
VIAL (ML) INTRAVENOUS AS NEEDED
Status: DISCONTINUED | OUTPATIENT
Start: 2024-01-16 | End: 2024-01-16 | Stop reason: SURG

## 2024-01-16 RX ORDER — LIDOCAINE HYDROCHLORIDE 20 MG/ML
INJECTION, SOLUTION EPIDURAL; INFILTRATION; INTRACAUDAL; PERINEURAL AS NEEDED
Status: DISCONTINUED | OUTPATIENT
Start: 2024-01-16 | End: 2024-01-16 | Stop reason: SURG

## 2024-01-16 RX ORDER — LISINOPRIL 5 MG/1
5 TABLET ORAL DAILY
COMMUNITY

## 2024-01-16 RX ORDER — SODIUM CHLORIDE 0.9 % (FLUSH) 0.9 %
10 SYRINGE (ML) INJECTION AS NEEDED
Status: CANCELLED | OUTPATIENT
Start: 2024-01-16

## 2024-01-16 RX ADMIN — LIDOCAINE HYDROCHLORIDE 100 MG: 20 INJECTION, SOLUTION EPIDURAL; INFILTRATION; INTRACAUDAL; PERINEURAL at 07:43

## 2024-01-16 RX ADMIN — SODIUM CHLORIDE 500 ML: 9 INJECTION, SOLUTION INTRAVENOUS at 07:24

## 2024-01-16 RX ADMIN — GLYCOPYRROLATE 0.2 MG: 0.2 INJECTION INTRAMUSCULAR; INTRAVENOUS at 07:42

## 2024-01-16 RX ADMIN — PROPOFOL INJECTABLE EMULSION 250 MG: 10 INJECTION, EMULSION INTRAVENOUS at 07:43

## 2024-01-16 NOTE — ANESTHESIA PREPROCEDURE EVALUATION
Anesthesia Evaluation     Patient summary reviewed and Nursing notes reviewed   no history of anesthetic complications:   NPO Solid Status: > 8 hours  NPO Liquid Status: > 2 hours           Airway   Mallampati: III  TM distance: >3 FB  Neck ROM: full  Possible difficult intubation and Small opening  Dental      Pulmonary    (+) ,sleep apnea on CPAP  (-) COPD, not a smoker  Cardiovascular   Exercise tolerance: good (4-7 METS)    (+) dysrhythmias Tachycardia  (-) past MI, CAD      Neuro/Psych  (-) seizures, TIA, CVA  GI/Hepatic/Renal/Endo    (+) morbid obesity, GERD  (-) liver disease, no renal disease, diabetes    Musculoskeletal     Abdominal   (+) obese   Substance History      OB/GYN          Other                          Anesthesia Plan    ASA 2     MAC   total IV anesthesia  intravenous induction     Anesthetic plan, risks, benefits, and alternatives have been provided, discussed and informed consent has been obtained with: patient.

## 2024-01-16 NOTE — ANESTHESIA POSTPROCEDURE EVALUATION
Patient: Gene Mohamud    Procedure Summary       Date: 01/16/24 Room / Location: [facility] ENDOSCOPY 5 / [facility] END[facility]COPY    Anesthesia Start: 0740 Anesthesia Stop: 0806    Procedures:       COLONOSCOPY WITH ANESTHESIA      ESOPHAGOGASTRODUODENOSCOPY WITH ANESTHESIA Diagnosis:       History of adenomatous polyp of colon      (History of adenomatous polyp of colon [Z86.010])    Surgeons: Jonathan Tai DO Provider: Carlos Evans CRNA    Anesthesia Type: MAC ASA Status: 2            Anesthesia Type: MAC    Vitals  No vitals data found for the desired time range.          Post Anesthesia Care and Evaluation    Patient location during evaluation: PHASE II  Patient participation: complete - patient participated  Level of consciousness: awake and alert  Pain score: 0  Pain management: adequate    Airway patency: patent  Anesthetic complications: No anesthetic complications  PONV Status: none  Cardiovascular status: acceptable and blood pressure returned to baseline  Respiratory status: acceptable  Hydration status: acceptable    Comments: Patient discharged from PACU based on Lauren score >8  No anesthesia care post op

## 2024-01-16 NOTE — H&P
"[facility] Gastroenterology  Pre Procedure History & Physical    Chief Complaint:   gERD    Subjective     HPI:   GERD    Past Medical History:   Past Medical History:   Diagnosis Date    Acid reflux     Bronchitis     History of ear infections     Sleep apnea     cpap at hs       Past Surgical History:  Past Surgical History:   Procedure Laterality Date    CATARACT EXTRACTION WITH INTRAOCULAR LENS IMPLANT Right     COLONOSCOPY  12/14/2015    COLONOSCOPY N/A 12/07/2018    Procedure: COLONOSCOPY WITH ANESTHESIA;  Surgeon: Jonathan Tai DO;  Location: [facility] ENDOSCOPY;  Service: Gastroenterology    ENDOSCOPY  12/14/2015    FRACTURE SURGERY Right     arm    HERNIA REPAIR         Family History:  Family History   Problem Relation Age of Onset    Diabetes Mother     Heart disease Mother     Heart disease Father     Diabetes Father     Cancer Sister     Cancer Brother     Cancer Paternal Grandmother     Cancer Paternal Grandfather     Colon cancer Neg Hx     Esophageal cancer Neg Hx        Social History:   reports that he has never smoked. He has never used smokeless tobacco. He reports that he does not drink alcohol and does not use drugs.    Medications:   Prior to Admission medications    Medication Sig Start Date End Date Taking? Authorizing Provider   atorvastatin (LIPITOR) 20 MG tablet Take 1 tablet by mouth Daily.   Yes Provider, MD Marisol   lisinopril (PRINIVIL,ZESTRIL) 5 MG tablet Take 1 tablet by mouth Daily.   Yes Provider, Historical, MD   pantoprazole (PROTONIX) 40 MG EC tablet Take 1 tablet by mouth 2 (Two) Times a Day Before Meals. 1/5/24  Yes Chas Ga APRN       Allergies:  Patient has no known allergies.    ROS:    General: Weight stable  Resp: No SOA  Cardiovascular: No CP    Objective     Blood pressure 131/74, pulse 84, temperature 96.8 °F (36 °C), temperature source Temporal, resp. rate 18, height 165.1 cm (65\"), weight 108 kg (237 lb), SpO2 96%.    Physical Exam "   Constitutional: Pt is oriented to person, place, and in no distress.   HENT: Mouth/Throat: Oropharynx is clear.   Cardiovascular: Normal rate, regular rhythm.    Pulmonary/Chest: Effort normal. No respiratory distress. No  wheezes.   Abdominal: Soft. Non-distended.  Skin: Skin is warm and dry.   Psychiatric: Mood, memory, affect and judgment appear normal.     Assessment & Plan     Diagnosis:  GERD    Anticipated Surgical Procedure:  E/C    The risks, benefits, and alternatives of this procedure have been discussed with the patient or the responsible party- the patient understands and agrees to proceed.

## 2024-01-17 LAB — UREASE TISS QL: NEGATIVE

## 2024-10-10 DIAGNOSIS — D72.829 LEUKOCYTOSIS, UNSPECIFIED TYPE: Primary | ICD-10-CM
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
## 2024-10-14 ENCOUNTER — CONSULT (OUTPATIENT)
Dept: ONCOLOGY | Facility: CLINIC | Age: 64
End: 2024-10-14
Payer: COMMERCIAL

## 2024-10-14 ENCOUNTER — LAB (OUTPATIENT)
Dept: LAB | Facility: HOSPITAL | Age: 64
End: 2024-10-14
Payer: COMMERCIAL

## 2024-10-14 VITALS
RESPIRATION RATE: 16 BRPM | HEART RATE: 82 BPM | DIASTOLIC BLOOD PRESSURE: 82 MMHG | SYSTOLIC BLOOD PRESSURE: 118 MMHG | OXYGEN SATURATION: 97 % | WEIGHT: 243 LBS | HEIGHT: 65 IN | TEMPERATURE: 97.2 F | BODY MASS INDEX: 40.48 KG/M2

## 2024-10-14 DIAGNOSIS — D72.829 LEUKOCYTOSIS, UNSPECIFIED TYPE: Primary | ICD-10-CM

## 2024-10-14 LAB
ALBUMIN SERPL-MCNC: 3.8 G/DL (ref 3.5–5.2)
ALBUMIN/GLOB SERPL: 1.1 G/DL
ALP SERPL-CCNC: 83 U/L (ref 39–117)
ALT SERPL W P-5'-P-CCNC: 17 U/L (ref 1–41)
ANION GAP SERPL CALCULATED.3IONS-SCNC: 10 MMOL/L (ref 5–15)
AST SERPL-CCNC: 18 U/L (ref 1–40)
BASOPHILS # BLD AUTO: 0.09 10*3/MM3 (ref 0–0.2)
BASOPHILS NFR BLD AUTO: 0.8 % (ref 0–1.5)
BILIRUB SERPL-MCNC: 0.5 MG/DL (ref 0–1.2)
BUN SERPL-MCNC: 7 MG/DL (ref 8–23)
BUN/CREAT SERPL: 8.8 (ref 7–25)
CALCIUM SPEC-SCNC: 9.3 MG/DL (ref 8.6–10.5)
CHLORIDE SERPL-SCNC: 103 MMOL/L (ref 98–107)
CO2 SERPL-SCNC: 25 MMOL/L (ref 22–29)
CREAT SERPL-MCNC: 0.8 MG/DL (ref 0.76–1.27)
CYTOLOGIST CVX/VAG CYTO: NORMAL
DEPRECATED RDW RBC AUTO: 42.8 FL (ref 37–54)
EGFRCR SERPLBLD CKD-EPI 2021: 99.4 ML/MIN/1.73
EOSINOPHIL # BLD AUTO: 0.26 10*3/MM3 (ref 0–0.4)
EOSINOPHIL NFR BLD AUTO: 2.4 % (ref 0.3–6.2)
ERYTHROCYTE [DISTWIDTH] IN BLOOD BY AUTOMATED COUNT: 13.8 % (ref 12.3–15.4)
GLOBULIN UR ELPH-MCNC: 3.4 GM/DL
GLUCOSE SERPL-MCNC: 126 MG/DL (ref 65–99)
HCT VFR BLD AUTO: 43.7 % (ref 37.5–51)
HGB BLD-MCNC: 13.9 G/DL (ref 13–17.7)
HOLD SPECIMEN: NORMAL
IMM GRANULOCYTES # BLD AUTO: 0.08 10*3/MM3 (ref 0–0.05)
IMM GRANULOCYTES NFR BLD AUTO: 0.7 % (ref 0–0.5)
LDH SERPL-CCNC: 131 U/L (ref 135–225)
LYMPHOCYTES # BLD AUTO: 2.45 10*3/MM3 (ref 0.7–3.1)
LYMPHOCYTES NFR BLD AUTO: 22.7 % (ref 19.6–45.3)
MCH RBC QN AUTO: 27.1 PG (ref 26.6–33)
MCHC RBC AUTO-ENTMCNC: 31.8 G/DL (ref 31.5–35.7)
MCV RBC AUTO: 85.2 FL (ref 79–97)
MONOCYTES # BLD AUTO: 0.87 10*3/MM3 (ref 0.1–0.9)
MONOCYTES NFR BLD AUTO: 8.1 % (ref 5–12)
NEUTROPHILS NFR BLD AUTO: 65.3 % (ref 42.7–76)
NEUTROPHILS NFR BLD AUTO: 7.04 10*3/MM3 (ref 1.7–7)
NRBC BLD AUTO-RTO: 0 /100 WBC (ref 0–0.2)
PATH INTERP BLD-IMP: NORMAL
PLATELET # BLD AUTO: 225 10*3/MM3 (ref 140–450)
PMV BLD AUTO: 11.7 FL (ref 6–12)
POTASSIUM SERPL-SCNC: 3.9 MMOL/L (ref 3.5–5.2)
PROT SERPL-MCNC: 7.2 G/DL (ref 6–8.5)
RBC # BLD AUTO: 5.13 10*6/MM3 (ref 4.14–5.8)
SODIUM SERPL-SCNC: 138 MMOL/L (ref 136–145)
WBC NRBC COR # BLD AUTO: 10.79 10*3/MM3 (ref 3.4–10.8)
WHOLE BLOOD HOLD SPECIMEN: NORMAL
WHOLE BLOOD HOLD SPECIMEN: NORMAL

## 2024-10-14 PROCEDURE — 85060 BLOOD SMEAR INTERPRETATION: CPT

## 2024-10-14 PROCEDURE — 81270 JAK2 GENE: CPT

## 2024-10-14 PROCEDURE — 99214 OFFICE O/P EST MOD 30 MIN: CPT | Performed by: NURSE PRACTITIONER

## 2024-10-14 PROCEDURE — 36415 COLL VENOUS BLD VENIPUNCTURE: CPT

## 2024-10-14 PROCEDURE — 83615 LACTATE (LD) (LDH) ENZYME: CPT

## 2024-10-14 PROCEDURE — 80053 COMPREHEN METABOLIC PANEL: CPT

## 2024-10-14 PROCEDURE — 85025 COMPLETE CBC W/AUTO DIFF WBC: CPT
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
## 2024-10-15 ENCOUNTER — PATIENT ROUNDING (BHMG ONLY) (OUTPATIENT)
Dept: ONCOLOGY | Facility: CLINIC | Age: 64
End: 2024-10-15
Payer: COMMERCIAL

## 2024-10-15 LAB — Lab: NORMAL

## 2024-10-15 NOTE — PROGRESS NOTES
October 15, 2024    Hello, may I speak with Gene Mohamud?    My name is Shirin      I am  with [facility] HEMATOLOGY & ONCOLOGY  [address]  [facility] [address] [phone].    Before we get started may I verify your date of birth? 1960    I am calling to officially welcome you to our practice and ask about your recent visit. Is this a good time to talk? yes    Tell me about your visit with us. What things went well?  It went very well. Everyone was very nice, everything was timely, and Rand was thorough. I have no complaints.        We're always looking for ways to make our patients' experiences even better. Do you have recommendations on ways we may improve?  no    Overall were you satisfied with your first visit to our practice? yes       I appreciate you taking the time to speak with me today. Is there anything else I can do for you? no      Thank you, and have a great day.

## 2024-10-16 LAB — BCR ABL RESULT: NORMAL

## 2024-10-17 LAB
JAK2 P.V617F BLD/T QL: NORMAL
LAB DIRECTOR NAME PROVIDER: NORMAL
LABORATORY COMMENT REPORT: NORMAL

## 2024-11-04 LAB — REF LAB TEST METHOD: NORMAL
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
## 2025-03-06 ENCOUNTER — OFFICE VISIT (OUTPATIENT)
Age: 65
End: 2025-03-06
Payer: COMMERCIAL

## 2025-03-06 VITALS — WEIGHT: 241 LBS | HEIGHT: 66 IN | BODY MASS INDEX: 38.73 KG/M2

## 2025-03-06 DIAGNOSIS — S52.571P OTHER CLOSED INTRA-ARTICULAR FRACTURE OF DISTAL END OF RIGHT RADIUS WITH MALUNION, SUBSEQUENT ENCOUNTER: Primary | ICD-10-CM

## 2025-03-06 DIAGNOSIS — M25.531 RIGHT WRIST PAIN: ICD-10-CM

## 2025-03-06 PROCEDURE — 99214 OFFICE O/P EST MOD 30 MIN: CPT | Performed by: NURSE PRACTITIONER

## 2025-03-06 RX ORDER — LISINOPRIL 5 MG/1
5 TABLET ORAL DAILY
COMMUNITY

## 2025-03-06 RX ORDER — PANTOPRAZOLE SODIUM 40 MG/1
40 TABLET, DELAYED RELEASE ORAL 2 TIMES DAILY
COMMUNITY

## 2025-03-06 RX ORDER — ATORVASTATIN CALCIUM 20 MG/1
20 TABLET, FILM COATED ORAL DAILY
COMMUNITY

## 2025-04-28 ENCOUNTER — LAB (OUTPATIENT)
Dept: LAB | Facility: HOSPITAL | Age: 65
End: 2025-04-28
Payer: COMMERCIAL

## 2025-04-28 DIAGNOSIS — D72.829 LEUKOCYTOSIS, UNSPECIFIED TYPE: ICD-10-CM

## 2025-04-28 LAB
ALBUMIN SERPL-MCNC: 3.7 G/DL (ref 3.5–5.2)
ALBUMIN/GLOB SERPL: 1.1 G/DL
ALP SERPL-CCNC: 77 U/L (ref 39–117)
ALT SERPL W P-5'-P-CCNC: 21 U/L (ref 1–41)
ANION GAP SERPL CALCULATED.3IONS-SCNC: 10 MMOL/L (ref 5–15)
AST SERPL-CCNC: 17 U/L (ref 1–40)
BASOPHILS # BLD AUTO: 0.07 10*3/MM3 (ref 0–0.2)
BASOPHILS NFR BLD AUTO: 0.6 % (ref 0–1.5)
BILIRUB SERPL-MCNC: 0.3 MG/DL (ref 0–1.2)
BUN SERPL-MCNC: 11 MG/DL (ref 8–23)
BUN/CREAT SERPL: 14.3 (ref 7–25)
CALCIUM SPEC-SCNC: 8.7 MG/DL (ref 8.6–10.5)
CHLORIDE SERPL-SCNC: 103 MMOL/L (ref 98–107)
CO2 SERPL-SCNC: 26 MMOL/L (ref 22–29)
CREAT SERPL-MCNC: 0.77 MG/DL (ref 0.76–1.27)
DEPRECATED RDW RBC AUTO: 43.8 FL (ref 37–54)
EGFRCR SERPLBLD CKD-EPI 2021: 100.6 ML/MIN/1.73
EOSINOPHIL # BLD AUTO: 0.33 10*3/MM3 (ref 0–0.4)
EOSINOPHIL NFR BLD AUTO: 3 % (ref 0.3–6.2)
ERYTHROCYTE [DISTWIDTH] IN BLOOD BY AUTOMATED COUNT: 14.4 % (ref 12.3–15.4)
GLOBULIN UR ELPH-MCNC: 3.5 GM/DL
GLUCOSE SERPL-MCNC: 134 MG/DL (ref 65–99)
HCT VFR BLD AUTO: 42.8 % (ref 37.5–51)
HGB BLD-MCNC: 13.9 G/DL (ref 13–17.7)
IMM GRANULOCYTES # BLD AUTO: 0.06 10*3/MM3 (ref 0–0.05)
IMM GRANULOCYTES NFR BLD AUTO: 0.5 % (ref 0–0.5)
LYMPHOCYTES # BLD AUTO: 2.68 10*3/MM3 (ref 0.7–3.1)
LYMPHOCYTES NFR BLD AUTO: 24.5 % (ref 19.6–45.3)
MCH RBC QN AUTO: 27.4 PG (ref 26.6–33)
MCHC RBC AUTO-ENTMCNC: 32.5 G/DL (ref 31.5–35.7)
MCV RBC AUTO: 84.3 FL (ref 79–97)
MONOCYTES # BLD AUTO: 0.9 10*3/MM3 (ref 0.1–0.9)
MONOCYTES NFR BLD AUTO: 8.2 % (ref 5–12)
NEUTROPHILS NFR BLD AUTO: 6.89 10*3/MM3 (ref 1.7–7)
NEUTROPHILS NFR BLD AUTO: 63.2 % (ref 42.7–76)
NRBC BLD AUTO-RTO: 0 /100 WBC (ref 0–0.2)
PLATELET # BLD AUTO: 225 10*3/MM3 (ref 140–450)
PMV BLD AUTO: 11.5 FL (ref 6–12)
POTASSIUM SERPL-SCNC: 4.4 MMOL/L (ref 3.5–5.2)
PROT SERPL-MCNC: 7.2 G/DL (ref 6–8.5)
RBC # BLD AUTO: 5.08 10*6/MM3 (ref 4.14–5.8)
SODIUM SERPL-SCNC: 139 MMOL/L (ref 136–145)
WBC NRBC COR # BLD AUTO: 10.93 10*3/MM3 (ref 3.4–10.8)

## 2025-04-28 PROCEDURE — 36415 COLL VENOUS BLD VENIPUNCTURE: CPT

## 2025-04-28 PROCEDURE — 80053 COMPREHEN METABOLIC PANEL: CPT

## 2025-04-28 PROCEDURE — 85025 COMPLETE CBC W/AUTO DIFF WBC: CPT

## 2025-05-07 ENCOUNTER — OFFICE VISIT (OUTPATIENT)
Dept: ONCOLOGY | Facility: CLINIC | Age: 65
End: 2025-05-07
Payer: COMMERCIAL

## 2025-05-07 VITALS
BODY MASS INDEX: 40.35 KG/M2 | HEIGHT: 65 IN | WEIGHT: 242.2 LBS | RESPIRATION RATE: 16 BRPM | HEART RATE: 74 BPM | DIASTOLIC BLOOD PRESSURE: 72 MMHG | TEMPERATURE: 97.5 F | OXYGEN SATURATION: 96 % | SYSTOLIC BLOOD PRESSURE: 128 MMHG

## 2025-05-07 DIAGNOSIS — D72.829 LEUKOCYTOSIS, UNSPECIFIED TYPE: Primary | ICD-10-CM

## 2025-05-07 PROCEDURE — 99213 OFFICE O/P EST LOW 20 MIN: CPT | Performed by: NURSE PRACTITIONER

## 2025-05-07 NOTE — PROGRESS NOTES
[facility] HEMATOLOGY & [facility]COLOGY  [address]  [facility] [address] [phone]    Patient Name: Gene Mohamud  Encounter Date: 05/07/2025   YOB: 1960  Patient Number: [id number]    PROGRESS NOTE     HISTORY OF PRESENT ILLNESS: Gene Mohamud is a 64 y.o. male followed by this office for leukocytosis.  His initial work up was unremarkable.  JAK2 V617f Negative.  BCR ABL Negative   Flow Cytometry with No significant immunophenotypic abnormalities of myeloid and lymphoid cells.  He denies any fever, night sweats.  He had labs drawn and results were reviewed with him in office.         PAST MEDICAL HISTORY:  ALLERGIES:  No Known Allergies  CURRENT MEDICATIONS:  Outpatient Encounter Medications as of 5/7/2025   Medication Sig Dispense Refill    APPLE CIDER VINEGAR PO Take  by mouth.      atorvastatin (LIPITOR) 20 MG tablet Take 1 tablet by mouth Daily.      lisinopril (PRINIVIL,ZESTRIL) 5 MG tablet Take 1 tablet by mouth Daily.      pantoprazole (PROTONIX) 40 MG EC tablet Take 1 tablet by mouth 2 (Two) Times a Day Before Meals. 180 tablet 3    Saw Palmetto, Serenoa repens, (SAW PALMETTO PO) Take  by mouth.       No facility-administered encounter medications on file as of 5/7/2025.     ADULT ILLNESSES:  Patient Active Problem List   Diagnosis Code    Gastroesophageal reflux disease without esophagitis K21.9    History of adenomatous polyp of colon Z86.0101       HEALTH MAINTENANCE ITEMS:  Health Maintenance Due   Topic Date Due    Pneumococcal Vaccine 50+ (1 of 1 - PCV) Never done    HEPATITIS C SCREENING  Never done    ANNUAL PHYSICAL  Never done    COVID-19 Vaccine (5 - 2024-25 season) 09/01/2024       <no information>  Last Completed Colonoscopy            Upcoming       COLORECTAL CANCER SCREENING (COLONOSCOPY - Every 5 Years) Next due on 1/16/2029 01/16/2024  Surgical Procedure: COLONOSCOPY    01/16/2024  COLONOSCOPY     12/07/2018  COLONOSCOPY    12/07/2018  Surgical Procedure: COLONOSCOPY    03/28/2017  Outside Claim: [facility] BLOOD OCCULT,BY PEROXID,FECES,SINGLE, COLORECTAL SCREEN     Only the first 5 history entries have been loaded, but more history exists.                        Immunization History   Administered Date(s) Administered    COVID-19 (MODERNA) 1st,2nd,3rd Dose Monovalent 12/26/2020, 01/23/2021    COVID-19 (MODERNA) BIVALENT 12+YRS 11/23/2022    COVID-19 (MODERNA) Monovalent Original Booster 12/03/2021     Last Completed Mammogram    This patient has no relevant Health Maintenance data.           FAMILY HISTORY:  Family History   Problem Relation Age of Onset    Diabetes Mother     Heart disease Mother     Heart disease Father     Diabetes Father     Cancer Sister     Cancer Brother     Cancer Paternal Grandmother     Cancer Paternal Grandfather     Colon cancer Neg Hx     Esophageal cancer Neg Hx      SOCIAL HISTORY:  Social History     Socioeconomic History    Marital status:    Tobacco Use    Smoking status: Never    Smokeless tobacco: Never   Vaping Use    Vaping status: Never Used   Substance and Sexual Activity    Alcohol use: No    Drug use: No    Sexual activity: Defer       REVIEW OF SYSTEMS:  Review of Systems   Constitutional:  Positive for fatigue. Negative for activity change, appetite change, fever, unexpected weight gain and unexpected weight loss.        Intermittent night sweats for past couple of years      HENT:  Negative for dental problem, facial swelling, swollen glands and trouble swallowing.    Eyes:  Negative for double vision and discharge.   Respiratory:  Positive for wheezing. Negative for cough and shortness of breath.    Cardiovascular:  Negative for chest pain, palpitations and leg swelling.   Gastrointestinal:  Negative for abdominal pain, blood in stool, nausea and vomiting.   Endocrine: Negative.    Genitourinary:  Positive for frequency. Negative for dysuria and hematuria.        " Taking Saw Palmetto   Musculoskeletal:  Positive for arthralgias and back pain. Negative for myalgias.   Skin:  Negative for rash, skin lesions and wound.   Allergic/Immunologic: Negative for immunocompromised state.   Neurological:  Negative for speech difficulty, light-headedness, headache, memory problem and confusion.   Hematological:  Negative for adenopathy.   Psychiatric/Behavioral:  Negative for self-injury, suicidal ideas and depressed mood. The patient is not nervous/anxious.        /72   Pulse 74   Temp 97.5 °F (36.4 °C)   Resp 16   Ht 165.1 cm (65\")   Wt 110 kg (242 lb 3.2 oz)   SpO2 96%   BMI 40.30 kg/m²  Body surface area is 2.15 meters squared.    Pain Score    05/07/25 0744   PainSc: 0-No pain          Physical Exam  Constitutional:       Appearance: Normal appearance.   HENT:      Head: Normocephalic and atraumatic.   Cardiovascular:      Rate and Rhythm: Normal rate and regular rhythm.   Pulmonary:      Effort: Pulmonary effort is normal.      Breath sounds: Normal breath sounds.   Abdominal:      General: Bowel sounds are normal.      Palpations: Abdomen is soft.   Musculoskeletal:      Right lower leg: No edema.      Left lower leg: No edema.   Skin:     General: Skin is warm and dry.   Neurological:      Mental Status: He is alert and oriented to person, place, and time.   Psychiatric:         Attention and Perception: Attention normal.         Mood and Affect: Mood normal.         Judgment: Judgment normal.       Gene Mohamud reports a pain score of 0.  Given his pain assessment as noted, treatment options were discussed and the following options were decided upon as a follow-up plan to address the patient's pain:  no intervention indicated .    COLONOSCOPY (01/16/2024 07:38)  UPPER GI ENDOSCOPY (01/16/2024 07:37)    ASSESSMENT / PLAN:  Recent Results (from the past 2 weeks)   Comprehensive Metabolic Panel    Collection Time: 04/28/25  7:52 AM    Specimen: Arm, Left; Blood "   Result Value Ref Range    Glucose 134 (H) 65 - 99 mg/dL    BUN 11 8 - 23 mg/dL    Creatinine 0.77 0.76 - 1.27 mg/dL    Sodium 139 136 - 145 mmol/L    Potassium 4.4 3.5 - 5.2 mmol/L    Chloride 103 98 - 107 mmol/L    CO2 26.0 22.0 - 29.0 mmol/L    Calcium 8.7 8.6 - 10.5 mg/dL    Total Protein 7.2 6.0 - 8.5 g/dL    Albumin 3.7 3.5 - 5.2 g/dL    ALT (SGPT) 21 1 - 41 U/L    AST (SGOT) 17 1 - 40 U/L    Alkaline Phosphatase 77 39 - 117 U/L    Total Bilirubin 0.3 0.0 - 1.2 mg/dL    Globulin 3.5 gm/dL    A/G Ratio 1.1 g/dL    BUN/Creatinine Ratio 14.3 7.0 - 25.0    Anion Gap 10.0 5.0 - 15.0 mmol/L    eGFR 100.6 >60.0 mL/min/1.73   CBC Auto Differential    Collection Time: 04/28/25  7:52 AM    Specimen: Arm, Left; Blood   Result Value Ref Range    WBC 10.93 (H) 3.40 - 10.80 10*3/mm3    RBC 5.08 4.14 - 5.80 10*6/mm3    Hemoglobin 13.9 13.0 - 17.7 g/dL    Hematocrit 42.8 37.5 - 51.0 %    MCV 84.3 79.0 - 97.0 fL    MCH 27.4 26.6 - 33.0 pg    MCHC 32.5 31.5 - 35.7 g/dL    RDW 14.4 12.3 - 15.4 %    RDW-SD 43.8 37.0 - 54.0 fl    MPV 11.5 6.0 - 12.0 fL    Platelets 225 140 - 450 10*3/mm3    Neutrophil % 63.2 42.7 - 76.0 %    Lymphocyte % 24.5 19.6 - 45.3 %    Monocyte % 8.2 5.0 - 12.0 %    Eosinophil % 3.0 0.3 - 6.2 %    Basophil % 0.6 0.0 - 1.5 %    Immature Grans % 0.5 0.0 - 0.5 %    Neutrophils, Absolute 6.89 1.70 - 7.00 10*3/mm3    Lymphocytes, Absolute 2.68 0.70 - 3.10 10*3/mm3    Monocytes, Absolute 0.90 0.10 - 0.90 10*3/mm3    Eosinophils, Absolute 0.33 0.00 - 0.40 10*3/mm3    Basophils, Absolute 0.07 0.00 - 0.20 10*3/mm3    Immature Grans, Absolute 0.06 (H) 0.00 - 0.05 10*3/mm3    nRBC 0.0 0.0 - 0.2 /100 WBC         1. Leukocytosis, unspecified type         Assessment & Plan  1. Leukocytosis.   JAK2 V617f Negative   BCR ABL Negative   Flow Cytometry with No significant immunophenotypic abnormalities of myeloid and lymphoid cells  -Labs 4/28/25:  WBC 10.93, Differential unremarkable.      PLAN:   Labs are unremarkable.  Pt  will follow with PCP.  If the leukocytosis returns we are happy to see him back.    Continue current medications, follow up, treatment plans per PCP and any other provider.  Care discussed with patient.  Understanding expressed.  Patient agreeable with plan.          Rand Garza, APRN  05/07/2025

## 2025-05-20 ENCOUNTER — TELEPHONE (OUTPATIENT)
Dept: GASTROENTEROLOGY | Facility: CLINIC | Age: 65
End: 2025-05-20
Payer: COMMERCIAL

## 2025-05-20 RX ORDER — PANTOPRAZOLE SODIUM 40 MG/1
40 TABLET, DELAYED RELEASE ORAL EVERY 12 HOURS SCHEDULED
Qty: 180 TABLET | Refills: 3 | OUTPATIENT
Start: 2025-05-20

## (undated) DEVICE — CUFF,BP,DISP,1 TUBE,ADULT,HP: Brand: MEDLINE

## (undated) DEVICE — MASK,OXYGEN,MED CONC,ADLT,7' TUB, UC: Brand: PENDING

## (undated) DEVICE — THE CHANNEL CLEANING BRUSH IS A NYLON FLEXI BRUSH ATTACHED TO A FLEXIBLE PLASTIC SHEATH DESIGNED TO SAFELY REMOVE DEBRIS FROM FLEXIBLE ENDOSCOPES.

## (undated) DEVICE — ENDOGATOR AUXILIARY WATER JET CONNECTOR: Brand: ENDOGATOR

## (undated) DEVICE — SENSR O2 OXIMAX FNGR A/ 18IN NONSTR

## (undated) DEVICE — Device: Brand: DEFENDO AIR/WATER/SUCTION AND BIOPSY VALVE

## (undated) DEVICE — TBG SMPL FLTR LINE NASL 02/C02 A/ BX/100

## (undated) DEVICE — YANKAUER,BULB TIP WITH VENT: Brand: ARGYLE

## (undated) DEVICE — CONMED SCOPE SAVER BITE BLOCK, 20X27 MM: Brand: SCOPE SAVER

## (undated) DEVICE — FRCP BX RADJAW4 NDL 2.8 240 STD OG